# Patient Record
Sex: MALE | Race: BLACK OR AFRICAN AMERICAN | NOT HISPANIC OR LATINO | Employment: OTHER | URBAN - METROPOLITAN AREA
[De-identification: names, ages, dates, MRNs, and addresses within clinical notes are randomized per-mention and may not be internally consistent; named-entity substitution may affect disease eponyms.]

---

## 2018-09-26 ENCOUNTER — HISTORICAL (OUTPATIENT)
Dept: ADMINISTRATIVE | Facility: HOSPITAL | Age: 73
End: 2018-09-26

## 2018-09-28 LAB — FINAL CULTURE: NORMAL

## 2021-01-30 ENCOUNTER — HISTORICAL (OUTPATIENT)
Dept: ADMINISTRATIVE | Facility: HOSPITAL | Age: 76
End: 2021-01-30

## 2021-02-05 LAB
FINAL CULTURE: NORMAL
FINAL CULTURE: NORMAL

## 2024-02-26 ENCOUNTER — HOSPITAL ENCOUNTER (EMERGENCY)
Facility: HOSPITAL | Age: 79
Discharge: HOME | End: 2024-02-26
Attending: EMERGENCY MEDICINE
Payer: MEDICARE

## 2024-02-26 VITALS
OXYGEN SATURATION: 100 % | RESPIRATION RATE: 18 BRPM | SYSTOLIC BLOOD PRESSURE: 129 MMHG | DIASTOLIC BLOOD PRESSURE: 83 MMHG | TEMPERATURE: 99 F | HEART RATE: 102 BPM | BODY MASS INDEX: 19 KG/M2 | WEIGHT: 156 LBS | HEIGHT: 76 IN

## 2024-02-26 DIAGNOSIS — M25.561 KNEE PAIN, RIGHT: ICD-10-CM

## 2024-02-26 DIAGNOSIS — M25.562 LEFT KNEE PAIN: ICD-10-CM

## 2024-02-26 DIAGNOSIS — M19.022 ARTHRITIS OF LEFT ELBOW: ICD-10-CM

## 2024-02-26 DIAGNOSIS — M17.0 TRICOMPARTMENT OSTEOARTHRITIS OF KNEES, BILATERAL: Primary | ICD-10-CM

## 2024-02-26 DIAGNOSIS — M25.522 ELBOW PAIN, LEFT: ICD-10-CM

## 2024-02-26 DIAGNOSIS — R70.0 ELEVATED ERYTHROCYTE SEDIMENTATION RATE: ICD-10-CM

## 2024-02-26 LAB
ALBUMIN SERPL-MCNC: 3.2 G/DL (ref 3.4–4.8)
ALBUMIN/GLOB SERPL: 0.6 RATIO (ref 1.1–2)
ALP SERPL-CCNC: 129 UNIT/L (ref 40–150)
ALT SERPL-CCNC: 14 UNIT/L (ref 0–55)
APPEARANCE UR: ABNORMAL
AST SERPL-CCNC: 17 UNIT/L (ref 5–34)
BACTERIA #/AREA URNS AUTO: ABNORMAL /HPF
BASOPHILS # BLD AUTO: 0.03 X10(3)/MCL
BASOPHILS NFR BLD AUTO: 0.2 %
BILIRUB SERPL-MCNC: 0.5 MG/DL
BILIRUB UR QL STRIP.AUTO: NEGATIVE
BUN SERPL-MCNC: 24.5 MG/DL (ref 8.4–25.7)
CALCIUM SERPL-MCNC: 10 MG/DL (ref 8.8–10)
CHLORIDE SERPL-SCNC: 99 MMOL/L (ref 98–107)
CK SERPL-CCNC: 96 U/L (ref 30–200)
CO2 SERPL-SCNC: 20 MMOL/L (ref 23–31)
COLOR UR AUTO: YELLOW
CREAT SERPL-MCNC: 1.62 MG/DL (ref 0.73–1.18)
EOSINOPHIL # BLD AUTO: 0.01 X10(3)/MCL (ref 0–0.9)
EOSINOPHIL NFR BLD AUTO: 0.1 %
ERYTHROCYTE [DISTWIDTH] IN BLOOD BY AUTOMATED COUNT: 13.4 % (ref 11.5–17)
ERYTHROCYTE [SEDIMENTATION RATE] IN BLOOD: >130 MM/HR (ref 0–15)
GFR SERPLBLD CREATININE-BSD FMLA CKD-EPI: 43 MLS/MIN/1.73/M2
GLOBULIN SER-MCNC: 5.3 GM/DL (ref 2.4–3.5)
GLUCOSE SERPL-MCNC: 251 MG/DL (ref 82–115)
GLUCOSE UR QL STRIP.AUTO: 250
GRAN CASTS URNS QL MICRO: ABNORMAL /LPF
HCT VFR BLD AUTO: 35.5 % (ref 42–52)
HGB BLD-MCNC: 11.6 G/DL (ref 14–18)
HYALINE CASTS URNS QL MICRO: ABNORMAL /LPF
IMM GRANULOCYTES # BLD AUTO: 0.06 X10(3)/MCL (ref 0–0.04)
IMM GRANULOCYTES NFR BLD AUTO: 0.5 %
KETONES UR QL STRIP.AUTO: 15
LACTATE SERPL-SCNC: 1.2 MMOL/L (ref 0.5–2.2)
LEUKOCYTE ESTERASE UR QL STRIP.AUTO: NEGATIVE
LYMPHOCYTES # BLD AUTO: 1.81 X10(3)/MCL (ref 0.6–4.6)
LYMPHOCYTES NFR BLD AUTO: 13.9 %
MAGNESIUM SERPL-MCNC: 2.1 MG/DL (ref 1.6–2.6)
MCH RBC QN AUTO: 29.2 PG (ref 27–31)
MCHC RBC AUTO-ENTMCNC: 32.7 G/DL (ref 33–36)
MCV RBC AUTO: 89.4 FL (ref 80–94)
MONOCYTES # BLD AUTO: 1.91 X10(3)/MCL (ref 0.1–1.3)
MONOCYTES NFR BLD AUTO: 14.6 %
NEUTROPHILS # BLD AUTO: 9.24 X10(3)/MCL (ref 2.1–9.2)
NEUTROPHILS NFR BLD AUTO: 70.7 %
NITRITE UR QL STRIP.AUTO: NEGATIVE
NRBC BLD AUTO-RTO: 0 %
PH UR STRIP.AUTO: 5.5 [PH]
PLATELET # BLD AUTO: 131 X10(3)/MCL (ref 130–400)
PLATELETS.RETICULATED NFR BLD AUTO: 18.2 % (ref 0.9–11.2)
PMV BLD AUTO: 13.9 FL (ref 7.4–10.4)
POTASSIUM SERPL-SCNC: 4.1 MMOL/L (ref 3.5–5.1)
PROT SERPL-MCNC: 8.5 GM/DL (ref 5.8–7.6)
PROT UR QL STRIP.AUTO: 100
RBC # BLD AUTO: 3.97 X10(6)/MCL (ref 4.7–6.1)
RBC #/AREA URNS AUTO: ABNORMAL /HPF
RBC UR QL AUTO: ABNORMAL
SODIUM SERPL-SCNC: 132 MMOL/L (ref 136–145)
SP GR UR STRIP.AUTO: >=1.03 (ref 1–1.03)
SQUAMOUS #/AREA URNS AUTO: ABNORMAL /HPF
TROPONIN I SERPL-MCNC: 0.01 NG/ML (ref 0–0.04)
TSH SERPL-ACNC: 0.81 UIU/ML (ref 0.35–4.94)
UROBILINOGEN UR STRIP-ACNC: 0.2
WBC # SPEC AUTO: 13.06 X10(3)/MCL (ref 4.5–11.5)
WBC #/AREA URNS AUTO: ABNORMAL /HPF

## 2024-02-26 PROCEDURE — 63600175 PHARM REV CODE 636 W HCPCS: Performed by: EMERGENCY MEDICINE

## 2024-02-26 PROCEDURE — 82550 ASSAY OF CK (CPK): CPT | Performed by: EMERGENCY MEDICINE

## 2024-02-26 PROCEDURE — 80053 COMPREHEN METABOLIC PANEL: CPT | Performed by: EMERGENCY MEDICINE

## 2024-02-26 PROCEDURE — 87040 BLOOD CULTURE FOR BACTERIA: CPT | Performed by: EMERGENCY MEDICINE

## 2024-02-26 PROCEDURE — 83605 ASSAY OF LACTIC ACID: CPT | Performed by: EMERGENCY MEDICINE

## 2024-02-26 PROCEDURE — 83735 ASSAY OF MAGNESIUM: CPT | Performed by: EMERGENCY MEDICINE

## 2024-02-26 PROCEDURE — 85025 COMPLETE CBC W/AUTO DIFF WBC: CPT | Performed by: EMERGENCY MEDICINE

## 2024-02-26 PROCEDURE — 81003 URINALYSIS AUTO W/O SCOPE: CPT | Performed by: EMERGENCY MEDICINE

## 2024-02-26 PROCEDURE — 84484 ASSAY OF TROPONIN QUANT: CPT | Performed by: EMERGENCY MEDICINE

## 2024-02-26 PROCEDURE — 84443 ASSAY THYROID STIM HORMONE: CPT | Performed by: EMERGENCY MEDICINE

## 2024-02-26 PROCEDURE — 86225 DNA ANTIBODY NATIVE: CPT | Performed by: EMERGENCY MEDICINE

## 2024-02-26 PROCEDURE — 96374 THER/PROPH/DIAG INJ IV PUSH: CPT

## 2024-02-26 PROCEDURE — 85652 RBC SED RATE AUTOMATED: CPT | Performed by: EMERGENCY MEDICINE

## 2024-02-26 PROCEDURE — 86431 RHEUMATOID FACTOR QUANT: CPT | Performed by: EMERGENCY MEDICINE

## 2024-02-26 PROCEDURE — 99284 EMERGENCY DEPT VISIT MOD MDM: CPT | Mod: 25

## 2024-02-26 RX ORDER — DEXAMETHASONE SODIUM PHOSPHATE 4 MG/ML
8 INJECTION, SOLUTION INTRA-ARTICULAR; INTRALESIONAL; INTRAMUSCULAR; INTRAVENOUS; SOFT TISSUE
Status: COMPLETED | OUTPATIENT
Start: 2024-02-26 | End: 2024-02-26

## 2024-02-26 RX ORDER — HYDROCODONE BITARTRATE AND ACETAMINOPHEN 5; 325 MG/1; MG/1
1 TABLET ORAL EVERY 6 HOURS PRN
Qty: 20 TABLET | Refills: 0 | Status: SHIPPED | OUTPATIENT
Start: 2024-02-26 | End: 2024-03-02

## 2024-02-26 RX ORDER — PREDNISONE 10 MG/1
TABLET ORAL
Qty: 60 TABLET | Refills: 0 | Status: SHIPPED | OUTPATIENT
Start: 2024-02-26 | End: 2024-03-17

## 2024-02-26 RX ADMIN — DEXAMETHASONE SODIUM PHOSPHATE 8 MG: 4 INJECTION, SOLUTION INTRA-ARTICULAR; INTRALESIONAL; INTRAMUSCULAR; INTRAVENOUS; SOFT TISSUE at 11:02

## 2024-02-26 NOTE — ED PROVIDER NOTES
"Encounter Date: 2/26/2024       History     Chief Complaint   Patient presents with    Joint Pain     Pt complaint of pain to left elbow and both knees with family relating hx of arthritis     The history is provided by the patient.   Joint Pain  This is a chronic problem. The current episode started more than 1 week ago. The problem occurs constantly. The problem has been rapidly worsening. Pertinent negatives include no chest pain and no shortness of breath. The symptoms are aggravated by walking, standing and bending. Nothing relieves the symptoms.   Saw PCP about 2 weeks ago and prescribed tramadol, with mild relief.  Daughter states fairly rapid decline over the past 3 weeks, and that prior to this, despite having been diagnosed with "arthritis", he was fairly active, cutting grass, etc.  Daughter states PCP was trying to refer to rheumatology but was unable to get a referral accepted.   Reports no labs or radiography was done.    Review of patient's allergies indicates:   Allergen Reactions    Sulfa (sulfonamide antibiotics)      Past Medical History:   Diagnosis Date    Cardiac dysrhythmia     Hypertension      Past Surgical History:   Procedure Laterality Date    INSERTION OF PERMANENT PACEMAKER       No family history on file.     Review of Systems   Constitutional:  Negative for fever.   HENT:  Negative for sore throat.    Respiratory:  Negative for shortness of breath.    Cardiovascular:  Negative for chest pain.   Gastrointestinal:  Negative for nausea.   Genitourinary:  Negative for dysuria.   Musculoskeletal:  Negative for back pain.   Skin:  Negative for rash.   Neurological:  Negative for weakness.   Hematological:  Does not bruise/bleed easily.       Physical Exam     Initial Vitals [02/26/24 0946]   BP Pulse Resp Temp SpO2   113/76 (!) 112 18 98.5 °F (36.9 °C) 98 %      MAP       --         Physical Exam    Nursing note and vitals reviewed.  Constitutional: He appears well-developed and " well-nourished.   HENT:   Head: Normocephalic and atraumatic.   Right Ear: External ear normal.   Left Ear: External ear normal.   Nose: Nose normal.   Eyes: Conjunctivae and EOM are normal. Pupils are equal, round, and reactive to light.   Neck: Neck supple.   Normal range of motion.  Cardiovascular:  Regular rhythm, normal heart sounds and intact distal pulses.   Tachycardia present.         Pulmonary/Chest: Breath sounds normal.   Abdominal: Abdomen is soft. Bowel sounds are normal.   Musculoskeletal:         General: Normal range of motion.      Cervical back: Normal range of motion and neck supple.     Neurological: He is alert and oriented to person, place, and time. He has normal strength. GCS score is 15. GCS eye subscore is 4. GCS verbal subscore is 5. GCS motor subscore is 6.   Skin: Skin is warm and dry. Capillary refill takes less than 2 seconds.   Shiny, hairless skin noted to BLE   Psychiatric: He has a normal mood and affect. His behavior is normal. Judgment and thought content normal.         ED Course   Procedures  Labs Reviewed   COMPREHENSIVE METABOLIC PANEL - Abnormal; Notable for the following components:       Result Value    Sodium Level 132 (*)     Carbon Dioxide 20 (*)     Glucose Level 251 (*)     Creatinine 1.62 (*)     Protein Total 8.5 (*)     Albumin Level 3.2 (*)     Globulin 5.3 (*)     Albumin/Globulin Ratio 0.6 (*)     All other components within normal limits   URINALYSIS, REFLEX TO URINE CULTURE - Abnormal; Notable for the following components:    Appearance, UA SL CLOUDY (*)     Protein,  (*)     Glucose,  (*)     Ketones, UA 15 (*)     Blood, UA Moderate (*)     All other components within normal limits   SEDIMENTATION RATE, AUTOMATED - Abnormal; Notable for the following components:    Sed Rate >130 (*)     All other components within normal limits   CBC WITH DIFFERENTIAL - Abnormal; Notable for the following components:    WBC 13.06 (*)     RBC 3.97 (*)     Hgb  11.6 (*)     Hct 35.5 (*)     MCHC 32.7 (*)     MPV 13.9 (*)     IPF 18.2 (*)     Neut # 9.24 (*)     Mono # 1.91 (*)     IG# 0.06 (*)     All other components within normal limits   URINALYSIS, MICROSCOPIC - Abnormal; Notable for the following components:    Bacteria, UA Few (*)     Hyaline Casts, UA Few (*)     Granular Casts, UA Moderate (*)     All other components within normal limits   MAGNESIUM - Normal   TROPONIN I - Normal   TSH - Normal   CK - Normal   LACTIC ACID, PLASMA - Normal   BLOOD CULTURE OLG   BLOOD CULTURE [facility]   CBC W/ AUTO DIFFERENTIAL    Narrative:     The following orders were created for panel order CBC auto differential.  Procedure                               Abnormality         Status                     ---------                               -----------         ------                     CBC with Differential[[id number]]       Abnormal            Final result                 Please view results for these tests on the individual orders.   RHEUMATOID FACTOR IGM   ANTINUCLEAR ANTIBODIES          Imaging Results              X-Ray Elbow Complete Left (Final result)  Result time 02/26/24 13:48:28      Final result by Huseyin Shirley MD (02/26/24 13:48:28)                   Impression:      Joint effusion may be due to moderate degenerative changes.  No fracture is identified.      Electronically signed by: Huseyin Shirley MD  Date:    02/26/2024  Time:    13:48               Narrative:    EXAMINATION:  Four views left elbow    CLINICAL HISTORY:  Pain    COMPARISON:  None    FINDINGS:  No displaced fracture or dislocation is identified.  There is a joint effusion.  Joint space narrowing and osteophyte formation are present throughout the left elbow.                        Wet Read by William Recinos MD (02/26/24 11:32:59, [facility] - Emergency Dept, Emergency Medicine)    Degenerative changes, NAF                                     X-Ray Knee Complete 4 or More  Views Left (Final result)  Result time 02/26/24 11:48:21      Final result by Devendra Escoto MD (02/26/24 11:48:21)                   Impression:      Advanced degenerative changes.      Electronically signed by: Devendra Escoto  Date:    02/26/2024  Time:    11:48               Narrative:    EXAMINATION:  XR KNEE COMP 4 OR MORE VIEWS LEFT    CLINICAL HISTORY:  Pain in left knee    COMPARISON:  None    FINDINGS:  Four views left knee.  There is no acute fracture or dislocation.  Severe degenerative changes with osteophytosis and marked tricompartmental joint space narrowing.  No sizeable joint effusion.                        Wet Read by William Recinos MD (02/26/24 11:33:31, [facility] - Emergency Dept, Emergency Medicine)    End stage DJD                                     X-Ray Knee Complete 4 Or More Views Right (Final result)  Result time 02/26/24 11:47:46      Final result by Devendra Escoto MD (02/26/24 11:47:46)                   Impression:      Advanced degenerative changes.      Electronically signed by: Devendra Escoto  Date:    02/26/2024  Time:    11:47               Narrative:    EXAMINATION:  XR KNEE COMP 4 OR MORE VIEWS RIGHT    CLINICAL HISTORY:  Pain in right knee    COMPARISON:  5 September 2016    FINDINGS:  Four views right knee.  No acute fracture or dislocation.  Advanced degenerative changes of the knee with osteophytosis and marked tricompartmental joint space narrowing.  No sizeable joint effusion.                        Wet Read by William Recinos MD (02/26/24 11:33:57, [facility] - Emergency Dept, Emergency Medicine)    End stage DJD                                     Medications   dexAMETHasone injection 8 mg (8 mg Intravenous Given 2/26/24 1141)     Medical Decision Making  Amount and/or Complexity of Data Reviewed  Labs: ordered. Decision-making details documented in ED Course.  Radiology: ordered and independent interpretation  performed. Decision-making details documented in ED Course.    Risk  Prescription drug management.       Differential includes:  OA, RA, PMR, gout, pseudogout, autoimmune disease, deconditioning, infectious process, electrolyte disturbance, anemia, PAD.  Will obtain CBC, CMP, mag, troponin, TSH, UA, ESR, rheumatoid factor, BETH and x-rays of both knees and left elbow.  Will give trial of steroids and analgesics and needed.  Consider admission due to impaired mobility.        ED Course as of 02/26/24 1552   Mon Feb 26, 2024   1546 Feeling much better after IV steroids.  ESR markedly elevated - could very well be an autoimmune process.  Since he is responding favorably to steroids, will D/C with steroid taper and analgesic and have him F/U with PCP for results of autoimmune work-up.  Will also refer to [facility] for severe DJD bilateral knees.  Patient and daughter are in agreement with the plan. 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 Provider    predniSONE (DELTASONE) 10 MG tablet Take 5 tablets (50 mg total) by mouth once daily for 4 days, THEN 4 tablets (40 mg total) once daily for 4 days, THEN 3 tablets (30 mg total) once daily for 4 days, THEN 2 tablets (20 mg total) once daily for 4 days, THEN 1 tablet (10 mg total) once daily for 4 days. 60 tablet 2/26/2024 3/17/2024 William Recinos MD    HYDROcodone-acetaminophen (NORCO) 5-325 mg per tablet Take 1 tablet by mouth every 6 (six) hours as needed for Pain. 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  Return to ED for worsening symptoms.                 William Recinos MD  02/26/24 [id number]

## 2024-02-26 NOTE — ED TRIAGE NOTES
Pt complaint of pain to left elbow and both knees with family relating hx of arthritis. Pt trying to find a rheumatologist and is current under care of Dr Geiger in [place]

## 2024-02-27 LAB
ANTINUCLEAR ANTIBODY SCREEN (OHS): NEGATIVE
DSDNA AB QUANT (OHS): 0.8 IU/ML
RHEUMATOID FACTOR IGM QUANTITATIVE (OLG): <0.6 IU/ML

## 2024-03-02 LAB
BACTERIA BLD CULT: NORMAL
BACTERIA BLD CULT: NORMAL

## 2024-03-06 ENCOUNTER — OFFICE VISIT (OUTPATIENT)
Dept: ORTHOPEDICS | Facility: CLINIC | Age: 79
End: 2024-03-06
Payer: MEDICARE

## 2024-03-06 VITALS
WEIGHT: 156 LBS | DIASTOLIC BLOOD PRESSURE: 87 MMHG | HEART RATE: 91 BPM | SYSTOLIC BLOOD PRESSURE: 139 MMHG | HEIGHT: 76 IN | BODY MASS INDEX: 19 KG/M2

## 2024-03-06 DIAGNOSIS — M17.0 TRICOMPARTMENT OSTEOARTHRITIS OF KNEES, BILATERAL: ICD-10-CM

## 2024-03-06 PROCEDURE — 20610 DRAIN/INJ JOINT/BURSA W/O US: CPT | Mod: 50,,, | Performed by: REHABILITATION UNIT

## 2024-03-06 PROCEDURE — 99204 OFFICE O/P NEW MOD 45 MIN: CPT | Mod: 25,,, | Performed by: REHABILITATION UNIT

## 2024-03-06 RX ORDER — LISINOPRIL 5 MG/1
5 TABLET ORAL
COMMUNITY
Start: 2023-12-20

## 2024-03-06 RX ORDER — ROSUVASTATIN CALCIUM 5 MG/1
5 TABLET, COATED ORAL
COMMUNITY
Start: 2024-02-03

## 2024-03-06 RX ORDER — CARVEDILOL 25 MG/1
12.5 TABLET ORAL 2 TIMES DAILY
COMMUNITY
Start: 2023-12-29

## 2024-03-06 RX ORDER — AMLODIPINE BESYLATE 10 MG/1
10 TABLET ORAL
COMMUNITY
Start: 2024-02-03

## 2024-03-06 RX ORDER — TRAMADOL HYDROCHLORIDE 50 MG/1
50 TABLET ORAL
COMMUNITY
Start: 2024-02-19

## 2024-03-06 RX ORDER — ZOLPIDEM TARTRATE 5 MG/1
5 TABLET ORAL NIGHTLY
COMMUNITY
Start: 2024-02-03

## 2024-03-06 RX ORDER — INSULIN GLARGINE 100 [IU]/ML
INJECTION, SOLUTION SUBCUTANEOUS
COMMUNITY
Start: 2024-01-15

## 2024-03-06 RX ADMIN — LIDOCAINE HYDROCHLORIDE 60 MG: 20 INJECTION, SOLUTION INFILTRATION; PERINEURAL at 02:03

## 2024-03-06 RX ADMIN — METHYLPREDNISOLONE ACETATE 80 MG: 80 INJECTION, SUSPENSION INTRA-ARTICULAR; INTRALESIONAL; INTRAMUSCULAR; SOFT TISSUE at 02:03

## 2024-03-06 NOTE — PROGRESS NOTES
Subjective:      Patient ID: Bhupinder Gamble is a 79 y.o. male.    Chief Complaint: Pain of the Left Knee (Maxim knee pain, ER 2/26/24 couldn't walk, reports able to walk lately, Reports pain has been better, less severe, tramadol and prednisone PRN with relief. ) and Pain of the Right Knee (Maxim knee pain, right knee is worse, wheelchair in visit today but reports bearing weight at home)    HPI:   Bhupinder Gamble is a 79 y.o. male who presents today for initial evaluation of his bilateral knees.     History of Present Illness  The patient presents for evaluation of bilateral knee pain. He is accompanied by an adult female.    His knees have been bothering him for several years. He is in a wheelchair today. He does not use a cane or walker to get around. The orals steroids that were given to him helped. He saw a doctor in [place] for his knees. He used to get steroid injections in his knees every year and everything was fine. He did not take any medications for his knees before the prednisone. No sensorimotor changes reported. Pain diffuse about knees.     Past Medical History:   Diagnosis Date    Cardiac dysrhythmia     Hypertension      Past Surgical History:   Procedure Laterality Date    INSERTION OF PERMANENT PACEMAKER       Social History     Socioeconomic History    Marital status:    Tobacco Use    Smoking status: Never    Smokeless tobacco: Never   Substance and Sexual Activity    Alcohol use: Not Currently    Drug use: Never    Sexual activity: Not Currently         Current Outpatient Medications:     amLODIPine (NORVASC) 10 MG tablet, Take 10 mg by mouth., Disp: , Rfl:     carvediloL (COREG) 25 MG tablet, Take 12.5 mg by mouth 2 (two) times daily., Disp: , Rfl:     LANTUS SOLOSTAR U-100 INSULIN glargine 100 units/mL SubQ pen, SMARTSIG:10 Unit(s) SUB-Q Every Morning, Disp: , Rfl:     lisinopriL (PRINIVIL,ZESTRIL) 5 MG tablet, Take 5 mg by mouth., Disp: , Rfl:     rosuvastatin (CRESTOR) 5 MG tablet, Take 5  "mg by mouth., Disp: , Rfl:     traMADoL (ULTRAM) 50 mg tablet, Take 50 mg by mouth., Disp: , Rfl:     zolpidem (AMBIEN) 5 MG Tab, Take 5 mg by mouth every evening., Disp: , Rfl:   Review of patient's allergies indicates:   Allergen Reactions    Sulfa (sulfonamide antibiotics)        /87 (BP Location: Left arm, Patient Position: Sitting, BP Method: Medium (Automatic))   Pulse 91   Ht 6' 4" (1.93 m)   Wt 70.8 kg (156 lb)   BMI 18.99 kg/m²     Comprehensive review of systems completed and negative except as per HPI.        Objective:   Head: Normocephalic, without obvious abnormality, atraumatic  Eyes: conjunctivae/corneas clear. EOM's intact  Ears: normal external appearance  Nose: Nares normal. Septum midline. Mucosa normal. No drainage  Throat: normal findings: lips normal without lesions  Lungs: unlabored breathing on room air  Chest wall: symmetric chest rise  Heart: regular rate and rhythm  Pulses: 2+ and symmetric  Skin: Skin color, texture, turgor normal. No rashes or lesions  Neurologic: Grossly normal    bilateral KNEE:     Alignment: neutral  Appearance: skin in intact without lesion  Effusion: small   Gait: cuca in w/c   Straight Leg Raise: negative  Log Roll: negative  Hip ROM: full and painless  Ankle ROM: full and painless   Knee ROM:  5 - 100  Tenderness: diffuse TTP    Patellar grind: +  PF Crepitus: +      Valgus Stress @ 0 deg: stable  Valgus Stress @ 30 deg: stable  Varus Stress @ 0 deg: stable  Varus Stress @ 30 deg: stable  Lachman: stable  Ant Drawer: negative   Post Drawer: negative  Posterior Sag Sign: negative  Neurological deficits: SILT dp/sp/t distributions  Motor: 5/5 EHL/FHL/TA/GS    Warm well perfused lower extremity with capillary refill less than 2 seconds and sensation intact to light touch in the terminal nerve distributions. Calf soft and easily compressible without clinical sign of DVT. No palpable popliteal lymphadenopathy    Assessment:     Imaging:     Advanced " degenerative changes to bilateral knees with loose bodies.     Narrative & Impression  EXAMINATION:  XR KNEE COMP 4 OR MORE VIEWS LEFT     CLINICAL HISTORY:  Pain in left knee     COMPARISON:  None     FINDINGS:  Four views left knee.  There is no acute fracture or dislocation.  Severe degenerative changes with osteophytosis and marked tricompartmental joint space narrowing.  No sizeable joint effusion.     Impression:     Advanced degenerative changes.        Electronically signed by: Devendra Escoto  Date:                                            02/26/2024  Time:                                           11:48    Narrative & Impression  EXAMINATION:  XR KNEE COMP 4 OR MORE VIEWS RIGHT     CLINICAL HISTORY:  Pain in right knee     COMPARISON:  5 September 2016     FINDINGS:  Four views right knee.  No acute fracture or dislocation.  Advanced degenerative changes of the knee with osteophytosis and marked tricompartmental joint space narrowing.  No sizeable joint effusion.     Impression:     Advanced degenerative changes.        Electronically signed by: Devendra Escoto  Date:                                            02/26/2024  Time:                                           11:47    Large Joint Aspiration/Injection: bilateral knee    Date/Time: 3/6/2024 2:30 PM    Performed by: Angel Garnica MD  Authorized by: Angel Garnica MD    Consent Done?:  Yes (Verbal)  Indications:  Pain and arthritis  Site marked: the procedure site was marked    Timeout: prior to procedure the correct patient, procedure, and site was verified    Prep: patient was prepped and draped in usual sterile fashion    Local anesthesia used?: No      Details:  Needle Size:  22 G  Ultrasonic Guidance for needle placement?: No    Approach:  Anterolateral  Location:  Knee  Laterality:  Bilateral  Site:  Bilateral knee  Medications (Right):  60 mg LIDOcaine HCL 20 mg/ml (2%) 20 mg/mL (2 %); 80 mg methylPREDNISolone acetate 80 mg/mL  Medications  (Left):  60 mg LIDOcaine HCL 20 mg/ml (2%) 20 mg/mL (2 %); 80 mg methylPREDNISolone acetate 80 mg/mL  Patient tolerance:  Patient tolerated the procedure well with no immediate complications        1. Tricompartment osteoarthritis of knees, bilateral          Plan:           Imaging and exam findings discussed.   Assessment & Plan  1. Bilateral knee end stage OA  He has severe arthritis in his knees. His motion is a little restricted as well.  We discussed his options.  He elected to proceed with steroid injections as these have previously given him some relief.  These were provided above and he tolerated it well.  Post-injection care was discussed.  Continue symptomatic treatment.  Avoid aggravating activities.  Continue working on motion.  Follow up with me as needed.  Eligible for repeat injections as frequently as every 3 months if desired.  Questions were answered and he was in agreement.

## 2024-03-25 RX ORDER — LIDOCAINE HYDROCHLORIDE 20 MG/ML
60 INJECTION, SOLUTION INFILTRATION; PERINEURAL
Status: DISCONTINUED | OUTPATIENT
Start: 2024-03-06 | End: 2024-03-25 | Stop reason: HOSPADM

## 2024-03-25 RX ORDER — METHYLPREDNISOLONE ACETATE 80 MG/ML
80 INJECTION, SUSPENSION INTRA-ARTICULAR; INTRALESIONAL; INTRAMUSCULAR; SOFT TISSUE
Status: DISCONTINUED | OUTPATIENT
Start: 2024-03-06 | End: 2024-03-25 | Stop reason: HOSPADM

## 2024-06-17 ENCOUNTER — OFFICE VISIT (OUTPATIENT)
Dept: ORTHOPEDICS | Facility: CLINIC | Age: 79
End: 2024-06-17
Payer: MEDICARE

## 2024-06-17 VITALS
WEIGHT: 156.06 LBS | SYSTOLIC BLOOD PRESSURE: 129 MMHG | DIASTOLIC BLOOD PRESSURE: 77 MMHG | HEIGHT: 76 IN | BODY MASS INDEX: 19 KG/M2 | HEART RATE: 82 BPM

## 2024-06-17 DIAGNOSIS — M17.0 TRICOMPARTMENT OSTEOARTHRITIS OF KNEES, BILATERAL: Primary | ICD-10-CM

## 2024-06-17 PROCEDURE — 20610 DRAIN/INJ JOINT/BURSA W/O US: CPT | Mod: 50,,, | Performed by: REHABILITATION UNIT

## 2024-06-17 PROCEDURE — 99213 OFFICE O/P EST LOW 20 MIN: CPT | Mod: 25,,, | Performed by: REHABILITATION UNIT

## 2024-06-17 RX ADMIN — BETAMETHASONE SODIUM PHOSPHATE AND BETAMETHASONE ACETATE 12 MG: 3; 3 INJECTION, SUSPENSION INTRA-ARTICULAR; INTRALESIONAL; INTRAMUSCULAR; SOFT TISSUE at 03:06

## 2024-06-17 RX ADMIN — LIDOCAINE HYDROCHLORIDE 40 MG: 20 INJECTION, SOLUTION INFILTRATION; PERINEURAL at 03:06

## 2024-06-17 NOTE — PROGRESS NOTES
Subjective:      Patient ID: Bhupinder Gamble is a 79 y.o. male.    Chief Complaint: Knee Pain (Maxim knee pain last injection 3/6/24- Reports swelling in the evening and pain is just in knees. Stated last injection helped with pain. IS ambulating with cane and presents in wheelchair today. Is requesting injections today. )    HPI:   Bhupinder Gamble is a 79 y.o. male who presents today for f/u evaluation of his bilateral knees.     History of Present Illness  The patient presents for evaluation of bilateral knee pain. He is accompanied by two family members. He was last seen a little over three months ago. He was provided with bilateral CSI which did provide some temporary pain relief. He is in a wheelchair today. He ambulates with a cane for shorter distances. No acute changes. Pain has returned and he is interested in repeat CSI.     Initial HPI:  His knees have been bothering him for several years. He is in a wheelchair today. He does not use a cane or walker to get around. The orals steroids that were given to him helped. He saw a doctor in [place] for his knees. He used to get steroid injections in his knees every year and everything was fine. He did not take any medications for his knees before the prednisone. No sensorimotor changes reported. Pain diffuse about knees.     Past Medical History:   Diagnosis Date    Cardiac dysrhythmia     Hypertension      Past Surgical History:   Procedure Laterality Date    INSERTION OF PERMANENT PACEMAKER       Social History     Socioeconomic History    Marital status:    Tobacco Use    Smoking status: Never    Smokeless tobacco: Never   Substance and Sexual Activity    Alcohol use: Not Currently    Drug use: Never    Sexual activity: Not Currently         Current Outpatient Medications:     amLODIPine (NORVASC) 10 MG tablet, Take 10 mg by mouth., Disp: , Rfl:     carvediloL (COREG) 25 MG tablet, Take 12.5 mg by mouth 2 (two) times daily., Disp: , Rfl:     LANTUS SOLOSTAR  "U-100 INSULIN glargine 100 units/mL SubQ pen, SMARTSIG:10 Unit(s) SUB-Q Every Morning, Disp: , Rfl:     lisinopriL (PRINIVIL,ZESTRIL) 5 MG tablet, Take 5 mg by mouth., Disp: , Rfl:     rosuvastatin (CRESTOR) 5 MG tablet, Take 5 mg by mouth., Disp: , Rfl:     traMADoL (ULTRAM) 50 mg tablet, Take 50 mg by mouth., Disp: , Rfl:     zolpidem (AMBIEN) 5 MG Tab, Take 5 mg by mouth every evening., Disp: , Rfl:   Review of patient's allergies indicates:   Allergen Reactions    Sulfa (sulfonamide antibiotics)        /77 (BP Location: Left arm, Patient Position: Sitting, BP Method: Medium (Automatic))   Pulse 82   Ht 6' 4" (1.93 m)   Wt 70.8 kg (156 lb 1.4 oz)   BMI 19.00 kg/m²     Comprehensive review of systems completed and negative except as per HPI.        Objective:   Head: Normocephalic, without obvious abnormality, atraumatic  Eyes: conjunctivae/corneas clear. EOM's intact  Ears: normal external appearance  Nose: Nares normal. Septum midline. Mucosa normal. No drainage  Throat: normal findings: lips normal without lesions  Lungs: unlabored breathing on room air  Chest wall: symmetric chest rise  Heart: regular rate and rhythm  Pulses: 2+ and symmetric  Skin: Skin color, texture, turgor normal. No rashes or lesions  Neurologic: Grossly normal    bilateral KNEE:     Alignment: neutral  Appearance: skin is intact without lesion  Effusion: small   Gait: cuca in w/c   Straight Leg Raise: negative  Log Roll: negative  Hip ROM: full and painless  Ankle ROM: full and painless   Knee ROM:  5 - 110  Tenderness: diffuse TTP    Patellar grind: +  PF Crepitus: +      Valgus Stress @ 0 deg: stable  Valgus Stress @ 30 deg: stable  Varus Stress @ 0 deg: stable  Varus Stress @ 30 deg: stable  Lachman: stable  Ant Drawer: negative   Post Drawer: negative  Posterior Sag Sign: negative  Neurological deficits: SILT dp/sp/t distributions  Motor: 5/5 EHL/FHL/TA/GS    Warm well perfused lower extremity with capillary refill less than " 2 seconds and sensation intact to light touch in the terminal nerve distributions. Calf soft and easily compressible without clinical sign of DVT. No palpable popliteal lymphadenopathy    Assessment:     Imaging:     Advanced degenerative changes to bilateral knees with loose bodies.     Narrative & Impression  EXAMINATION:  XR KNEE COMP 4 OR MORE VIEWS LEFT     CLINICAL HISTORY:  Pain in left knee     COMPARISON:  None     FINDINGS:  Four views left knee.  There is no acute fracture or dislocation.  Severe degenerative changes with osteophytosis and marked tricompartmental joint space narrowing.  No sizeable joint effusion.     Impression:     Advanced degenerative changes.        Electronically signed by: Devendra Escoto  Date:                                            02/26/2024  Time:                                           11:48    Narrative & Impression  EXAMINATION:  XR KNEE COMP 4 OR MORE VIEWS RIGHT     CLINICAL HISTORY:  Pain in right knee     COMPARISON:  5 September 2016     FINDINGS:  Four views right knee.  No acute fracture or dislocation.  Advanced degenerative changes of the knee with osteophytosis and marked tricompartmental joint space narrowing.  No sizeable joint effusion.     Impression:     Advanced degenerative changes.        Electronically signed by: Devendra Escoto  Date:                                            02/26/2024  Time:                                           11:47    Large Joint Aspiration/Injection: bilateral knee    Date/Time: 6/17/2024 3:30 PM    Performed by: Angel Garnica MD  Authorized by: Angel Garnica MD    Consent Done?:  Yes (Verbal)  Indications:  Pain and arthritis  Site marked: the procedure site was marked    Timeout: prior to procedure the correct patient, procedure, and site was verified    Prep: patient was prepped and draped in usual sterile fashion    Local anesthesia used?: No      Details:  Needle Size:  22 G  Ultrasonic Guidance for needle  placement?: No    Approach:  Anterolateral  Location:  Knee  Laterality:  Bilateral  Site:  Bilateral knee  Medications (Right):  40 mg LIDOcaine HCL 20 mg/ml (2%) 20 mg/mL (2 %); 12 mg betamethasone acetate-betamethasone sodium phosphate 6 mg/mL  Medications (Left):  40 mg LIDOcaine HCL 20 mg/ml (2%) 20 mg/mL (2 %); 12 mg betamethasone acetate-betamethasone sodium phosphate 6 mg/mL  Patient tolerance:  Patient tolerated the procedure well with no immediate complications        1. Tricompartment osteoarthritis of knees, bilateral            Plan:       Orders Placed This Encounter    Large Joint Aspiration/Injection      Imaging and exam findings discussed.   Assessment & Plan  1. Bilateral knee end stage OA  He has severe arthritis in his knees. His motion is a little restricted as well. He got relief with prior CSI. We discussed his options.  He elected to proceed with repeat steroid injections.  These were provided above and he tolerated it well.  Post-injection care was discussed.  Continue symptomatic treatment.  Avoid aggravating activities.  Continue working on motion.  Follow up with me as needed.  Eligible for repeat injections as frequently as every 3 months if desired.  Questions were answered and he was in agreement.

## 2024-06-23 RX ORDER — LIDOCAINE HYDROCHLORIDE 20 MG/ML
40 INJECTION, SOLUTION INFILTRATION; PERINEURAL
Status: DISCONTINUED | OUTPATIENT
Start: 2024-06-17 | End: 2024-06-23 | Stop reason: HOSPADM

## 2024-06-23 RX ORDER — BETAMETHASONE SODIUM PHOSPHATE AND BETAMETHASONE ACETATE 3; 3 MG/ML; MG/ML
12 INJECTION, SUSPENSION INTRA-ARTICULAR; INTRALESIONAL; INTRAMUSCULAR; SOFT TISSUE
Status: DISCONTINUED | OUTPATIENT
Start: 2024-06-17 | End: 2024-06-23 | Stop reason: HOSPADM

## 2024-11-21 ENCOUNTER — OFFICE VISIT (OUTPATIENT)
Dept: ORTHOPEDICS | Facility: CLINIC | Age: 79
End: 2024-11-21
Payer: MEDICARE

## 2024-11-21 VITALS
WEIGHT: 156.06 LBS | SYSTOLIC BLOOD PRESSURE: 143 MMHG | BODY MASS INDEX: 19 KG/M2 | HEART RATE: 75 BPM | DIASTOLIC BLOOD PRESSURE: 73 MMHG | HEIGHT: 76 IN

## 2024-11-21 DIAGNOSIS — M17.11 PRIMARY OSTEOARTHRITIS OF RIGHT KNEE: ICD-10-CM

## 2024-11-21 DIAGNOSIS — M17.12 PRIMARY OSTEOARTHRITIS OF LEFT KNEE: Primary | ICD-10-CM

## 2024-11-21 PROCEDURE — 20610 DRAIN/INJ JOINT/BURSA W/O US: CPT | Mod: 50,,, | Performed by: PHYSICIAN ASSISTANT

## 2024-11-21 PROCEDURE — 99213 OFFICE O/P EST LOW 20 MIN: CPT | Mod: 25,,, | Performed by: PHYSICIAN ASSISTANT

## 2024-11-21 RX ORDER — BETAMETHASONE SODIUM PHOSPHATE AND BETAMETHASONE ACETATE 3; 3 MG/ML; MG/ML
12 INJECTION, SUSPENSION INTRA-ARTICULAR; INTRALESIONAL; INTRAMUSCULAR; SOFT TISSUE
Status: DISCONTINUED | OUTPATIENT
Start: 2024-11-21 | End: 2024-11-21 | Stop reason: HOSPADM

## 2024-11-21 RX ORDER — LIDOCAINE HYDROCHLORIDE 20 MG/ML
2 INJECTION, SOLUTION INFILTRATION; PERINEURAL
Status: DISCONTINUED | OUTPATIENT
Start: 2024-11-21 | End: 2024-11-21 | Stop reason: HOSPADM

## 2024-11-21 RX ADMIN — BETAMETHASONE SODIUM PHOSPHATE AND BETAMETHASONE ACETATE 12 MG: 3; 3 INJECTION, SUSPENSION INTRA-ARTICULAR; INTRALESIONAL; INTRAMUSCULAR; SOFT TISSUE at 10:11

## 2024-11-21 RX ADMIN — LIDOCAINE HYDROCHLORIDE 2 MG: 20 INJECTION, SOLUTION INFILTRATION; PERINEURAL at 10:11

## 2024-11-21 NOTE — PROGRESS NOTES
"Chief Complaint:   Chief Complaint   Patient presents with    Knee Pain     Maxim knee pain - last injection was 06/17/2024 w/ relief. States pain started to increase a couple months ago. States both knees hurt about the same. Would like an injection today.        History of present illness:    This is a 79 y.o. year old male who complains of bilateral knee pain.    Patient does get injections which gave him some temporary relief in his here today inquiring about repeat injections   Patient was states he was not a good surgical candidate due to some hard as she was      Current Outpatient Medications   Medication Sig    amLODIPine (NORVASC) 10 MG tablet Take 10 mg by mouth.    carvediloL (COREG) 25 MG tablet Take 12.5 mg by mouth 2 (two) times daily.    LANTUS SOLOSTAR U-100 INSULIN glargine 100 units/mL SubQ pen SMARTSIG:10 Unit(s) SUB-Q Every Morning    lisinopriL (PRINIVIL,ZESTRIL) 5 MG tablet Take 5 mg by mouth.    rosuvastatin (CRESTOR) 5 MG tablet Take 5 mg by mouth.    traMADoL (ULTRAM) 50 mg tablet Take 50 mg by mouth.    zolpidem (AMBIEN) 5 MG Tab Take 5 mg by mouth every evening. (Patient not taking: Reported on 11/21/2024)     No current facility-administered medications for this visit.       Review of Systems:    Constitution:   Denies chills, fever, and sweats.  HENT:   Denies headaches or blurry vision.  Cardiovascular:  Denies chest pain or irregular heart beat.  Respiratory:   Denies cough or shortness of breath.  Gastrointestinal:  Denies abdominal pain, nausea, or vomiting.  Musculoskeletal:   Denies muscle cramps.  Neurological:   Denies dizziness or focal weakness.  Psychiatric/Behavior: Normal mental status.  Hematology/Lymph:  Denies bleeding problem or easy bruising/bleeding.  Skin:    Denies rash or suspicious lesions.    Examination:    Vital Signs:    Vitals:    11/21/24 1005   BP: (!) 143/73   Pulse: 75   Weight: 70.8 kg (156 lb 1.4 oz)   Height: 6' 4" (1.93 m)       Body mass index is 19 " kg/m².    Constitution:   Well-developed, well nourished patient in no acute distress.  Neurological:   Alert and oriented x 3 and cooperative to examination.     Psychiatric/Behavior: Normal mental status.  Respiratory:   No shortness of breath.  Eyes:    Extraoccular muscles intact  Skin:    No scars, rash or suspicious lesions.    Physical Exam:   Bilateral Knee     Grade effusion 1    No erythema, warmth bruising     active flexion 110   active extension 15    Tender over medial joint line  Tender over MCL  Positive lateral compartment tenderness   Negative  Remi test   Negative  lachman test   No instability on varus or valgus stress   No weakness of the  knee was observed.     Imaging: X-rays reviewed from previous appointment.    Patient has extensive degenerative joint disease of both of his knees with all 3 compartments being bone-on-bone       Assessment: Primary osteoarthritis of left knee  -     Large Joint Aspiration/Injection: bilateral knee    Primary osteoarthritis of right knee  -     Large Joint Aspiration/Injection: bilateral knee         Plan:  Bilateral knee injections.    Patient can call if he 4-5 months for repeat injections as needed    Large Joint Aspiration/Injection: bilateral knee    Date/Time: 11/21/2024 10:15 AM    Performed by: Deonte Zaman PA-C  Authorized by: Deonte Zaman PA-C    Consent Done?:  Yes (Verbal)  Indications:  Arthritis  Timeout: prior to procedure the correct patient, procedure, and site was verified    Prep: patient was prepped and draped in usual sterile fashion      Local anesthesia used?: Yes    Local anesthetic:  Lidocaine 2% without epinephrine    Details:  Needle Size:  25 G  Ultrasonic Guidance for needle placement?: No    Location:  Knee  Laterality:  Bilateral  Site:  Bilateral knee  Medications (Right):  2 mg LIDOcaine HCL 20 mg/ml (2%) 20 mg/mL (2 %); 12 mg betamethasone acetate-betamethasone sodium phosphate 6 mg/mL  Medications (Left):   2 mg LIDOcaine HCL 20 mg/ml (2%) 20 mg/mL (2 %); 12 mg betamethasone acetate-betamethasone sodium phosphate 6 mg/mL  Patient tolerance:  Patient tolerated the procedure well with no immediate complications       DISCLAIMER: This note may have been dictated using voice recognition software and may contain grammatical errors.     NOTE: Consult report sent to referring provider via [employer] EMR.

## 2024-11-21 NOTE — PROCEDURES
Large Joint Aspiration/Injection: bilateral knee    Date/Time: 11/21/2024 10:15 AM    Performed by: Deonte Zaman PA-C  Authorized by: Deonte Zaman PA-C    Consent Done?:  Yes (Verbal)  Indications:  Arthritis  Timeout: prior to procedure the correct patient, procedure, and site was verified    Prep: patient was prepped and draped in usual sterile fashion      Local anesthesia used?: Yes    Local anesthetic:  Lidocaine 2% without epinephrine    Details:  Needle Size:  25 G  Ultrasonic Guidance for needle placement?: No    Location:  Knee  Laterality:  Bilateral  Site:  Bilateral knee  Medications (Right):  2 mg LIDOcaine HCL 20 mg/ml (2%) 20 mg/mL (2 %); 12 mg betamethasone acetate-betamethasone sodium phosphate 6 mg/mL  Medications (Left):  2 mg LIDOcaine HCL 20 mg/ml (2%) 20 mg/mL (2 %); 12 mg betamethasone acetate-betamethasone sodium phosphate 6 mg/mL  Patient tolerance:  Patient tolerated the procedure well with no immediate complications